# Patient Record
Sex: MALE | Race: WHITE | NOT HISPANIC OR LATINO | Employment: OTHER | URBAN - METROPOLITAN AREA
[De-identification: names, ages, dates, MRNs, and addresses within clinical notes are randomized per-mention and may not be internally consistent; named-entity substitution may affect disease eponyms.]

---

## 2017-01-18 ENCOUNTER — TELEMEDICINE2 (OUTPATIENT)
Dept: MEDICAL GROUP | Age: 77
End: 2017-01-18
Payer: COMMERCIAL

## 2017-01-18 ENCOUNTER — TELEMEDICINE ORIGINATING SITE VISIT (OUTPATIENT)
Dept: MEDICAL GROUP | Facility: CLINIC | Age: 77
End: 2017-01-18
Payer: COMMERCIAL

## 2017-01-18 VITALS
SYSTOLIC BLOOD PRESSURE: 115 MMHG | WEIGHT: 212.1 LBS | HEART RATE: 79 BPM | TEMPERATURE: 96.7 F | HEIGHT: 68 IN | RESPIRATION RATE: 16 BRPM | OXYGEN SATURATION: 90 % | BODY MASS INDEX: 32.15 KG/M2 | DIASTOLIC BLOOD PRESSURE: 59 MMHG

## 2017-01-18 DIAGNOSIS — N40.0 BENIGN PROSTATIC HYPERPLASIA WITHOUT LOWER URINARY TRACT SYMPTOMS, UNSPECIFIED MORPHOLOGY: ICD-10-CM

## 2017-01-18 DIAGNOSIS — I10 ESSENTIAL HYPERTENSION: ICD-10-CM

## 2017-01-18 DIAGNOSIS — E11.69 TYPE 2 DIABETES MELLITUS WITH OTHER SPECIFIED COMPLICATION (HCC): ICD-10-CM

## 2017-01-18 DIAGNOSIS — I20.9 ANGINA PECTORIS (HCC): ICD-10-CM

## 2017-01-18 PROCEDURE — 99214 OFFICE O/P EST MOD 30 MIN: CPT | Mod: GT | Performed by: INTERNAL MEDICINE

## 2017-01-19 NOTE — PROGRESS NOTES
CC: Follow-up lab work    HPI:   Ventura presents today with the following.    1. Type 2 diabetes mellitus with other specified complication (HCC)  Treated with Lantus 20 units in the a.m., 15 units p.m. Checks blood sugar twice daily. A.m. blood sugars tend to run in the low 100s p.m. blood sugars in the 250s. Hemoglobin A1c has unchanged at 8.4. Declines follow-up with diabetic nutritionist.    2. Essential hypertension  Blood pressure has been stable on Prinivil and Norvasc    3. Angina pectoris (CMS-HCC)  Patient has regular follow-ups with Dr. Jim his cardiologist in [place]. Patient denies any chest pain shortness of breath or palpitations. Taking Imdur as prescribed  Renexa    4. Benign prostatic hyperplasia without lower urinary tract symptoms, unspecified morphology  PSA elevated at 4.4 up from 3.6. Patient follows up with urology in [place] every year. Last seen in October 2 016. Takes finasteride. Denies urinary symptoms.      Patient Active Problem List    Diagnosis Date Noted   • Chronic kidney disease, stage I 11/07/2016   • Angina pectoris (CMS-HCC) 08/03/2016   • COPD (chronic obstructive pulmonary disease) (CMS-HCC) 06/29/2016   • Hypertension 06/29/2016   • Diabetes (CMS-HCC) 06/29/2016   • BPH (benign prostatic hyperplasia) 06/29/2016   • Glaucoma 06/29/2016       Current Outpatient Prescriptions   Medication Sig Dispense Refill   • isosorbide mononitrate SR (IMDUR) 60 MG TABLET SR 24 HR Take 60 mg by mouth every day. Take one tablet by mouth daily.     • Blood Glucose Monitoring Suppl SUPPLIES Misc Patient checks 3-4 times a day 3 Each 3   • Lancets Misc Dorota Fastclix Lancets 100/box 3 Each 3   • Insulin Pen Needle (B-D UF III MINI PEN NEEDLES) 31G X 5 MM Misc Use as directed twice daily 100 Each 6   • finasteride (PROSCAR) 5 MG Tab Take 5 mg by mouth every day.  1   • lisinopril (PRINIVIL) 5 MG Tab Take 5 mg by mouth every day.     • amlodipine (NORVASC) 5 MG Tab Take 5 mg by mouth every day.    "  • dorzolamide (TRUSOPT) 2 % Solution Place 1 Drop in both eyes 2 Times a Day.     • latanoprost (XALATAN) 0.005 % Solution Place 1 Drop in both eyes every bedtime.     • LANTUS SOLOSTAR 100 UNIT/ML Solution Pen-injector injection      • ranolazine (RANEXA) 500 MG TABLET SR 12 HR Take 500 mg by mouth 2 times a day.     • aspirin EC (ECOTRIN) 81 MG Tablet Delayed Response Take 81 mg by mouth every day.     • Saw Palmetto, Serenoa repens, 450 MG Cap Take  by mouth.     • Multiple Vitamins-Minerals (CENTRUM SILVER ADULT 50+ PO) Take  by mouth.       No current facility-administered medications for this visit.         Allergies as of 01/18/2017 - Dani as Reviewed 01/18/2017   Allergen Reaction Noted   • Timolol Unspecified 06/29/2016        ROS: As per HPI. All other review of systems negative for cardiopulmonary, GI, neurologic    /59 mmHg  Pulse 79  Temp(Src) 35.9 °C (96.7 °F)  Resp 16  Ht 1.727 m (5' 8\")  Wt 96.208 kg (212 lb 1.6 oz)  BMI 32.26 kg/m2  SpO2 90%    Physical Exam:  Gen:         Alert and oriented, No apparent distress.  Neck:        No Lymphadenopathy or Bruits.  Lungs:     Clear to auscultation bilaterally  CV:          Regular rate and rhythm. No murmurs, rubs or gallops.  Abd:         Soft non tender, non distended. Normal active bowel sounds.  No  Hepatosplenomegaly, No pulsatile masses.                   Ext:          No clubbing, cyanosis, edema.      Assessment and Plan.   77 y.o. male with the following issues.    1. Type 2 diabetes mellitus with other specified complication (HCC)  Increase Lantus to 22 units every morning, continue with Lantus 15 units in the p.m.  Monitor blood sugars  Diabetic eye exam  Diabetic foot microfilament test on return  Discussed the importance of diabetic diet    2. Essential hypertension  Stable continue current medications    3. Angina pectoris (CMS-HCC)  Keep follow-up with cardiology as scheduled    4. Benign prostatic hyperplasia without lower " urinary tract symptoms, unspecified morphology  Monitor PSA  Follow-up with urology in the fall

## 2018-05-14 ENCOUNTER — TELEPHONE (OUTPATIENT)
Dept: MEDICAL GROUP | Facility: CLINIC | Age: 78
End: 2018-05-14

## 2018-05-15 ENCOUNTER — NON-PROVIDER VISIT (OUTPATIENT)
Dept: MEDICAL GROUP | Facility: CLINIC | Age: 78
End: 2018-05-15
Payer: COMMERCIAL

## 2018-05-15 DIAGNOSIS — E11.40 DIABETIC NEUROPATHY WITH NEUROLOGIC COMPLICATION (HCC): ICD-10-CM

## 2018-05-15 DIAGNOSIS — R53.82 CHRONIC FATIGUE: ICD-10-CM

## 2018-05-15 DIAGNOSIS — Z13.220 SCREENING CHOLESTEROL LEVEL: ICD-10-CM

## 2018-05-15 DIAGNOSIS — E11.49 DIABETIC NEUROPATHY WITH NEUROLOGIC COMPLICATION (HCC): ICD-10-CM

## 2018-05-15 DIAGNOSIS — N18.1 CHRONIC KIDNEY DISEASE, STAGE I: ICD-10-CM

## 2018-05-15 PROCEDURE — 36415 COLL VENOUS BLD VENIPUNCTURE: CPT | Performed by: INTERNAL MEDICINE

## 2018-05-15 NOTE — NON-PROVIDER
Ventura Kwok is a 78 y.o. male here for a non-provider visit for Venipuncture    If abnormal was an in office provider notified upon receipt of results (if so, indicate provider)? Yes  Routed to PCP? Yes

## 2019-04-26 ENCOUNTER — NURSE TRIAGE (OUTPATIENT)
Dept: CALL CENTER | Facility: HOSPITAL | Age: 79
End: 2019-04-26

## 2019-04-26 NOTE — TELEPHONE ENCOUNTER
"Caller is wanting medical records sent to Dr Virk in [facility].  Advised to call Monday when office is open.    Reason for Disposition  • [1] Caller requesting NON-URGENT health information AND [2] PCP's office is the best resource    Additional Information  • Negative: [1] Caller is not with the adult (patient) AND [2] reporting urgent symptoms  • Negative: Lab result questions  • Negative: Medication questions  • Negative: Caller cannot be reached by phone  • Negative: Caller has already spoken to PCP or another triager  • Negative: RN needs further essential information from caller in order to complete triage  • Negative: Requesting regular office appointment    Answer Assessment - Initial Assessment Questions  1. REASON FOR CALL or QUESTION: \"What is your reason for calling today?\" or \"How can I best help you?\" or \"What question do you have that I can help answer?\"      See note    Protocols used: INFORMATION ONLY CALL-ADULT-      "

## 2025-06-24 NOTE — MR AVS SNAPSHOT
"        Ventura Kwok   2017 2:00 PM   Telemedicine2   MRN: [id number]    Department:  [address] [place] [address]   Dept Phone:  [phone]    Description:  Male : 1940   Provider:  Wendy Lucero M.D.; TELEMED TONOP[facility]           Reason for Visit     Follow-Up Diabetes      Allergies as of 2017     Allergen Noted Reactions    Timolol 2016   Unspecified    Caused COPD to be exacerbated      Vital Signs     Blood Pressure Pulse Temperature Respirations Height Weight    115/59 mmHg 79 35.9 °C (96.7 °F) 16 1.727 m (5' 8\") 96.208 kg (212 lb 1.6 oz)    Body Mass Index Oxygen Saturation Smoking Status             32.26 kg/m2 90% Former Smoker         Basic Information     Date Of Birth Sex Race Ethnicity Preferred Language    1940 Male White Non- English      Problem List              ICD-10-CM Priority Class Noted - Resolved    COPD (chronic obstructive pulmonary disease) (CMS-HCC) J44.9   2016 - Present    Hypertension I10   2016 - Present    Diabetes (CMS-HCC) E11.9   2016 - Present    BPH (benign prostatic hyperplasia) N40.0   2016 - Present    Glaucoma H40.9   2016 - Present    Angina pectoris (CMS-HCC) I20.9   8/3/2016 - Present    Chronic kidney disease, stage I N18.1   2016 - Present      Health Maintenance        Date Due Completion Dates    DIABETES MONOFILAMENT / LE EXAM 1940 ---    IMM DTaP/Tdap/Td Vaccine (1 - Tdap) 1958 ---    COLONOSCOPY 1989 ---    IMM ZOSTER VACCINE 1999 ---    IMM PNEUMOCOCCAL 65+ (ADULT) LOW/MEDIUM RISK SERIES (1 of 2 - PCV13) 2004 ---    IMM INFLUENZA (1) 2016 ---    A1C SCREENING 2017, 2016 (Done)    Override on 2016: Done (8.4)    RETINAL SCREENING 10/26/2017 10/26/2016 (Done)    Override on 10/26/2016: Done    FASTING LIPID PROFILE 10/27/2017 10/27/2016 (Done)    Override on 10/27/2016: Done    URINE ACR / MICROALBUMIN 10/27/2017 10/27/2016 (Done)    Override on 10/27/2016: Done " Pt need note for work for todays visit and also needs correction on note already there on her chart .     She was here for an appointment today    Her  current note says excuse from 06/26 she want it be changed to 06/25 as she needs to  take medication tomorrow and also do blood work, once the letter has been completed , please release to [facility].      SERUM CREATININE 10/27/2017 10/27/2016 (Done)    Override on 10/27/2016: Done            Current Immunizations     No immunizations on file.      Below and/or attached are the medications your provider expects you to take. Review all of your home medications and newly ordered medications with your provider and/or pharmacist. Follow medication instructions as directed by your provider and/or pharmacist. Please keep your medication list with you and share with your provider. Update the information when medications are discontinued, doses are changed, or new medications (including over-the-counter products) are added; and carry medication information at all times in the event of emergency situations     Allergies:  TIMOLOL - Unspecified               Medications  Valid as of: January 18, 2017 -  2:46 PM    Generic Name Brand Name Tablet Size Instructions for use    AmLODIPine Besylate (Tab) NORVASC 5 MG Take 5 mg by mouth every day.        Aspirin (Tablet Delayed Response) ECOTRIN 81 MG Take 81 mg by mouth every day.        Blood Glucose Monitoring Suppl (Misc) Blood Glucose Monitoring Suppl SUPPLIES Patient checks 3-4 times a day        Dorzolamide HCl (Solution) TRUSOPT 2 % Place 1 Drop in both eyes 2 Times a Day.        Finasteride (Tab) PROSCAR 5 MG Take 5 mg by mouth every day.        Insulin Glargine (Solution Pen-injector) LANTUS SOLOSTAR 100 UNIT/ML         Insulin Pen Needle (Misc) Insulin Pen Needle 31G X 5 MM Use as directed twice daily        Isosorbide Mononitrate (TABLET SR 24 HR) IMDUR 60 MG Take 60 mg by mouth every day. Take one tablet by mouth daily.        Lancets (Misc) Lancets  Dorota Fastclix Lancets 100/box        Latanoprost (Solution) XALATAN 0.005 % Place 1 Drop in both eyes every bedtime.        Lisinopril (Tab) PRINIVIL 5 MG Take 5 mg by mouth every day.        Multiple Vitamins-Minerals   Take  by mouth.        Ranolazine (TABLET SR 12 HR) RANEXA 500 MG Take 500 mg by mouth 2 times a day.         Saw Palmetto (Serenoa repens) (Cap) Saw Palmetto (Serenoa repens) 450 MG Take  by mouth.        .                 Medicines prescribed today were sent to:     [id number] - [place], [place] - [address]    [address] [place] [address]    Phone: [phone] Fax: [phone]    Open 24 Hours?: No    [facility], [place] - [address]    [address] [facility] [place] [address]    Phone: [phone] Fax: [phone]    Open 24 Hours?: No      Medication refill instructions:       If your prescription bottle indicates you have medication refills left, it is not necessary to call your provider’s office. Please contact your pharmacy and they will refill your medication.    If your prescription bottle indicates you do not have any refills left, you may request refills at any time through one of the following ways: The online [employer] system (except Urgent Care), by calling your provider’s office, or by asking your pharmacy to contact your provider’s office with a refill request. Medication refills are processed only during regular business hours and may not be available until the next business day. Your provider may request additional information or to have a follow-up visit with you prior to refilling your medication.   *Please Note: Medication refills are assigned a new Rx number when refilled electronically. Your pharmacy may indicate that no refills were authorized even though a new prescription for the same medication is available at the pharmacy. Please request the medicine by name with the pharmacy before contacting your provider for a refill.           [employer] Access Code: Activation code not generated  Current [employer] Status: Active